# Patient Record
Sex: FEMALE | Race: WHITE | NOT HISPANIC OR LATINO | URBAN - METROPOLITAN AREA
[De-identification: names, ages, dates, MRNs, and addresses within clinical notes are randomized per-mention and may not be internally consistent; named-entity substitution may affect disease eponyms.]

---

## 2024-07-08 ENCOUNTER — TELEPHONE (OUTPATIENT)
Dept: PRIMARY CARE | Facility: CLINIC | Age: 54
End: 2024-07-08

## 2024-07-08 LAB
NON-UH HIE A/G RATIO: 1.2
NON-UH HIE ALB: 3.2 G/DL (ref 3.4–5)
NON-UH HIE ALK PHOS: 94 UNIT/L (ref 45–117)
NON-UH HIE APPEARANCE, U: CLEAR
NON-UH HIE BILIRUBIN, TOTAL: 0.5 MG/DL (ref 0.3–1.2)
NON-UH HIE BILIRUBIN, U: NEGATIVE
NON-UH HIE BLOOD, U: NEGATIVE
NON-UH HIE BUN/CREAT RATIO: 14.3
NON-UH HIE BUN: 10 MG/DL (ref 9–23)
NON-UH HIE CALCIUM: 9.1 MG/DL (ref 8.7–10.4)
NON-UH HIE CALCULATED LDL CHOLESTEROL: 114 MG/DL (ref 60–130)
NON-UH HIE CALCULATED OSMOLALITY: 285 MOSM/KG (ref 275–295)
NON-UH HIE CHLORIDE: 108 MMOL/L (ref 98–107)
NON-UH HIE CHOLESTEROL: 189 MG/DL (ref 100–200)
NON-UH HIE CO2, VENOUS: 29 MMOL/L (ref 20–31)
NON-UH HIE COLOR, U: ABNORMAL
NON-UH HIE CREATININE: 0.7 MG/DL (ref 0.5–0.8)
NON-UH HIE GFR AA: >60
NON-UH HIE GLOBULIN: 2.7 G/DL
NON-UH HIE GLOMERULAR FILTRATION RATE: >60 ML/MIN/1.73M?
NON-UH HIE GLUCOSE QUAL, U: NEGATIVE
NON-UH HIE GLUCOSE: 83 MG/DL (ref 74–106)
NON-UH HIE GOT: 18 UNIT/L (ref 15–37)
NON-UH HIE GPT: 17 UNIT/L (ref 10–49)
NON-UH HIE HCT: 39 % (ref 36–46)
NON-UH HIE HDL CHOLESTEROL: 63 MG/DL (ref 40–60)
NON-UH HIE HGB A1C: 5.4 %
NON-UH HIE HGB: 12.9 G/DL (ref 12–16)
NON-UH HIE INSTR WBC ND: 5.2
NON-UH HIE K: 3.8 MMOL/L (ref 3.5–5.1)
NON-UH HIE KETONES, U: NEGATIVE
NON-UH HIE LEUKOCYTE ESTERASE, U: ABNORMAL
NON-UH HIE MCH: 27.6 PG (ref 27–34)
NON-UH HIE MCHC: 33.1 G/DL (ref 32–37)
NON-UH HIE MCV: 83.3 FL (ref 80–100)
NON-UH HIE MPV: 9 FL (ref 7.4–10.4)
NON-UH HIE MUCOUS, U: ABNORMAL
NON-UH HIE NA: 144 MMOL/L (ref 135–145)
NON-UH HIE NITRITE, U: NEGATIVE
NON-UH HIE PH, U: 5 (ref 4.5–8)
NON-UH HIE PLATELET: 204 X10 (ref 150–450)
NON-UH HIE PROTEIN, U: NEGATIVE
NON-UH HIE RBC/HPF, U: 3 #/HPF (ref 0–3)
NON-UH HIE RBC: 4.68 X10 (ref 4.2–5.4)
NON-UH HIE RDW: 14.7 % (ref 11.5–14.5)
NON-UH HIE SPECIFIC GRAVITY, U: 1.02 (ref 1–1.03)
NON-UH HIE TOTAL CHOL/HDL CHOL RATIO: 3
NON-UH HIE TOTAL PROTEIN: 5.9 G/DL (ref 5.7–8.2)
NON-UH HIE TRIGLYCERIDES: 59 MG/DL (ref 30–150)
NON-UH HIE TSH: 1.87 UIU/ML (ref 0.55–4.78)
NON-UH HIE U MICRO: ABNORMAL
NON-UH HIE UROBILINOGEN QUAL, U: ABNORMAL
NON-UH HIE WBC/HPF, U: 17 #/HPF (ref 0–5)
NON-UH HIE WBC: 5.2 X10 (ref 4.5–11)

## 2024-07-08 NOTE — TELEPHONE ENCOUNTER
----- Message from Henrry Arthur MD sent at 7/8/2024  3:34 PM EDT -----  Albumin 3.2 mild UTI hemoglobin A1c 5.4    Drink lots of fluid plus Keflex 250 mg 3 times a day for 1 week follow-up 2 weeks bring all medicine with you

## 2024-07-10 ENCOUNTER — TELEPHONE (OUTPATIENT)
Dept: PRIMARY CARE | Facility: CLINIC | Age: 54
End: 2024-07-10

## 2024-07-10 DIAGNOSIS — N39.0 URINARY TRACT INFECTION WITHOUT HEMATURIA, SITE UNSPECIFIED: ICD-10-CM

## 2024-07-10 NOTE — TELEPHONE ENCOUNTER
NEEDS A LETTER STATING THAT THIS INDIVIDUAL  HAD LAB WORK DRAWN DUE TO WEIGHT ISSUES. WE ARE NOT PCP. SHE IS PT OF DR FOWLER.  BUT CAME IN WITH BROTHER IN LAW AND LABS WERE ORDERED. PER ELISE NEEDS TO BE GIVEN TO DR RIVERA    PT GIVES PLASMA AND THEY NEED THIS LETTER STATING WHY LABS DRAWN    FAX: [phone]

## 2024-07-11 RX ORDER — CEPHALEXIN 250 MG/1
250 CAPSULE ORAL 3 TIMES DAILY
Qty: 21 CAPSULE | Refills: 0 | Status: SHIPPED | OUTPATIENT
Start: 2024-07-11 | End: 2024-07-18

## 2024-07-11 NOTE — TELEPHONE ENCOUNTER
You mentioned you wanted to see her in 2 weeks. Patient wants to know if you can fit her in sooner. Your schedule is book. Would it be okay to double book for her?

## 2024-07-12 ENCOUNTER — TELEPHONE (OUTPATIENT)
Dept: PRIMARY CARE | Facility: CLINIC | Age: 54
End: 2024-07-12

## 2024-07-12 NOTE — TELEPHONE ENCOUNTER
NEEDS A LETTER STATING THAT THIS INDIVIDUAL  HAD LAB WORK DRAWN DUE TO WEIGHT ISSUES. WE ARE NOT PCP. SHE IS PT OF DR FOWLER.  BUT CAME IN WITH BROTHER IN LAW AND LABS WERE ORDERED. PER ELISE NEEDS TO BE GIVEN TO DR RIVERA     PT GIVES PLASMA AND THEY NEED THIS LETTER STATING WHY LABS DRAWN     FAX: [phone]

## 2024-07-17 ENCOUNTER — TELEPHONE (OUTPATIENT)
Dept: PRIMARY CARE | Facility: CLINIC | Age: 54
End: 2024-07-17

## 2024-07-17 NOTE — TELEPHONE ENCOUNTER
PT HAS BEEN REQUESTING LAB RESULTS AND A LETTER STATING SHE HAD BLOOD DRAWN DUE TO WEIGHT ISSUES. SHE HAS NOT PREVIOUSLY BEEN SEEN IN OUR OFFICE, BUT HAS SCHEDULED A NEW PT VISIT FOR 8/13. THE CONTACT INFORMATION FOR WHERE SHE NEEDS THINGS SENT IS:  PHONE: [phone]  FAX: [phone]

## 2024-08-13 ENCOUNTER — TELEPHONE (OUTPATIENT)
Dept: PRIMARY CARE | Facility: CLINIC | Age: 54
End: 2024-08-13

## 2024-08-13 ENCOUNTER — LAB (OUTPATIENT)
Dept: LAB | Facility: LAB | Age: 54
End: 2024-08-13
Payer: COMMERCIAL

## 2024-08-13 ENCOUNTER — APPOINTMENT (OUTPATIENT)
Dept: PRIMARY CARE | Facility: CLINIC | Age: 54
End: 2024-08-13
Payer: COMMERCIAL

## 2024-08-13 VITALS
TEMPERATURE: 97 F | WEIGHT: 278 LBS | SYSTOLIC BLOOD PRESSURE: 130 MMHG | OXYGEN SATURATION: 96 % | BODY MASS INDEX: 39.8 KG/M2 | HEART RATE: 83 BPM | HEIGHT: 70 IN | DIASTOLIC BLOOD PRESSURE: 77 MMHG

## 2024-08-13 DIAGNOSIS — N39.0 URINARY TRACT INFECTION WITHOUT HEMATURIA, SITE UNSPECIFIED: ICD-10-CM

## 2024-08-13 DIAGNOSIS — Z79.899 MEDICATION MANAGEMENT: ICD-10-CM

## 2024-08-13 DIAGNOSIS — Z12.31 SCREENING MAMMOGRAM FOR BREAST CANCER: ICD-10-CM

## 2024-08-13 DIAGNOSIS — Z00.01 ANNUAL VISIT FOR GENERAL ADULT MEDICAL EXAMINATION WITH ABNORMAL FINDINGS: ICD-10-CM

## 2024-08-13 DIAGNOSIS — F33.1 MODERATE EPISODE OF RECURRENT MAJOR DEPRESSIVE DISORDER (MULTI): ICD-10-CM

## 2024-08-13 DIAGNOSIS — Z00.00 MEDICARE ANNUAL WELLNESS VISIT, SUBSEQUENT: Primary | ICD-10-CM

## 2024-08-13 LAB
AMPHETAMINES UR QL SCN: NORMAL
BARBITURATES UR QL SCN: NORMAL
BENZODIAZ UR QL SCN: NORMAL
BZE UR QL SCN: NORMAL
CANNABINOIDS UR QL SCN: NORMAL
FENTANYL+NORFENTANYL UR QL SCN: NORMAL
HCV AB SER QL: NONREACTIVE
HIV 1+2 AB+HIV1 P24 AG SERPL QL IA: NONREACTIVE
METHADONE UR QL SCN: NORMAL
OPIATES UR QL SCN: NORMAL
OXYCODONE+OXYMORPHONE UR QL SCN: NORMAL
PCP UR QL SCN: NORMAL

## 2024-08-13 PROCEDURE — 87389 HIV-1 AG W/HIV-1&-2 AB AG IA: CPT

## 2024-08-13 PROCEDURE — 99497 ADVNCD CARE PLAN 30 MIN: CPT | Performed by: INTERNAL MEDICINE

## 2024-08-13 PROCEDURE — 36415 COLL VENOUS BLD VENIPUNCTURE: CPT

## 2024-08-13 PROCEDURE — 3008F BODY MASS INDEX DOCD: CPT | Performed by: INTERNAL MEDICINE

## 2024-08-13 PROCEDURE — G0439 PPPS, SUBSEQ VISIT: HCPCS | Performed by: INTERNAL MEDICINE

## 2024-08-13 PROCEDURE — 99213 OFFICE O/P EST LOW 20 MIN: CPT | Performed by: INTERNAL MEDICINE

## 2024-08-13 PROCEDURE — 1036F TOBACCO NON-USER: CPT | Performed by: INTERNAL MEDICINE

## 2024-08-13 PROCEDURE — 86803 HEPATITIS C AB TEST: CPT

## 2024-08-13 PROCEDURE — 80307 DRUG TEST PRSMV CHEM ANLYZR: CPT

## 2024-08-13 RX ORDER — BISMUTH SUBSALICYLATE 262 MG
1 TABLET,CHEWABLE ORAL DAILY
COMMUNITY
End: 2024-08-13 | Stop reason: ALTCHOICE

## 2024-08-13 RX ORDER — NAPROXEN SODIUM 220 MG/1
TABLET ORAL
COMMUNITY
End: 2024-08-13 | Stop reason: ALTCHOICE

## 2024-08-13 RX ORDER — PHENTERMINE HYDROCHLORIDE 37.5 MG/1
37.5 TABLET ORAL
Qty: 30 TABLET | Refills: 0 | Status: SHIPPED | OUTPATIENT
Start: 2024-08-13 | End: 2024-09-12

## 2024-08-13 RX ORDER — DULOXETIN HYDROCHLORIDE 30 MG/1
CAPSULE, DELAYED RELEASE ORAL
COMMUNITY
Start: 2014-11-17

## 2024-08-13 RX ORDER — PHENTERMINE HYDROCHLORIDE 37.5 MG/1
37.5 TABLET ORAL
Qty: 30 TABLET | Refills: 0 | Status: SHIPPED | OUTPATIENT
Start: 2024-08-13 | End: 2024-08-13 | Stop reason: SDUPTHER

## 2024-08-13 ASSESSMENT — ACTIVITIES OF DAILY LIVING (ADL)
DOING_HOUSEWORK: INDEPENDENT
BATHING: INDEPENDENT
TAKING_MEDICATION: INDEPENDENT
MANAGING_FINANCES: INDEPENDENT
GROCERY_SHOPPING: INDEPENDENT
DRESSING: INDEPENDENT

## 2024-08-13 ASSESSMENT — PATIENT HEALTH QUESTIONNAIRE - PHQ9
2. FEELING DOWN, DEPRESSED OR HOPELESS: SEVERAL DAYS
SUM OF ALL RESPONSES TO PHQ9 QUESTIONS 1 AND 2: 2
10. IF YOU CHECKED OFF ANY PROBLEMS, HOW DIFFICULT HAVE THESE PROBLEMS MADE IT FOR YOU TO DO YOUR WORK, TAKE CARE OF THINGS AT HOME, OR GET ALONG WITH OTHER PEOPLE: SOMEWHAT DIFFICULT
1. LITTLE INTEREST OR PLEASURE IN DOING THINGS: SEVERAL DAYS

## 2024-08-13 NOTE — PROGRESS NOTES
Assessment and Plan:  Problem List Items Addressed This Visit       Moderate episode of recurrent major depressive disorder (Multi)    Screening mammogram for breast cancer - Primary    Relevant Orders    BI mammo bilateral screening tomosynthesis    Adult BMI 39.0-39.9 kg/sq m    Relevant Orders    HIV 1/2 Antigen/Antibody Screen with Reflex to Confirmation    Colonoscopy Screening; Average Risk Patient    Hepatitis C antibody    BI mammo bilateral screening tomosynthesis    Drug Screen, Urine With Reflex to Confirmation    Urinary tract infection without hematuria    Relevant Orders    HIV 1/2 Antigen/Antibody Screen with Reflex to Confirmation    Colonoscopy Screening; Average Risk Patient    Hepatitis C antibody    BI mammo bilateral screening tomosynthesis    Drug Screen, Urine With Reflex to Confirmation         Chief Complaint:   Annual Medicare Wellness Office Exam/Comprehensive Problem Focused Follow Up and Physical Exam anxiety depression obesity UTI      HPI: 54-year-old patient reviewed over have no children    No brother 1 sister    Mother have blood pressure    Father have obesity    Personal history of chronic lower back pain chronic anxiety depression obesity sleep apnea    UTI    Complaining of the obesity obesity arthralgia myalgia fatigue tired back pain insomnia snoring    Negative for suicidal ideation    Negative for rectal bleeding vaginal bleeding hematuria    Negative for headache or chest pain    Negative for RSV flu or COVID    Discussed about depression discussed about DNR    .DNR voluntary discuss with patient about Advance care planning DO NOT RESUSCITATE I  spent more than 18 minutes discussing advanced Planning including an explanation and discussion of advanced directives discussed about a living will and power of  patient was encouraged to work on completing this documentation options are1= DO NOT RESUSCITATE CC2=, DO NOT RESUSCITATE  at arrest,3= full code documented in the  chart.        Patient Active Problem List:  Patient Active Problem List   Diagnosis    Moderate episode of recurrent major depressive disorder (Multi)    Screening mammogram for breast cancer    Adult BMI 39.0-39.9 kg/sq m    Urinary tract infection without hematuria          Comprehensive Medical/Surgical/Social/Family History:  No family history on file.    Past Medical History:   Diagnosis Date    Personal history of other diseases of the circulatory system     History of hypertension    Personal history of other mental and behavioral disorders     History of depression       Past Surgical History:   Procedure Laterality Date    OTHER SURGICAL HISTORY  11/25/2019    Hysterectomy total    OTHER SURGICAL HISTORY  11/25/2019    Appendectomy       Social History     Socioeconomic History    Marital status: Single   Tobacco Use    Smoking status: Former     Types: Cigarettes    Smokeless tobacco: Never   Substance and Sexual Activity    Alcohol use: Not Currently     Alcohol/week: 0.0 - 1.0 standard drinks of alcohol    Drug use: Never     Social Determinants of Health     Financial Resource Strain: Not on File (9/24/2020)    Received from [facility]    Financial Resource Strain     Financial Resource Strain: 0   Recent Concern: Financial Resource Strain - At Risk (9/24/2020)    Received from [facility]    Financial Resource Strain     Financial Resource Strain: 2   Food Insecurity: Not on File (9/24/2020)    Received from [facility]    Food Insecurity     Food: 0   Transportation Needs: Not on File (9/24/2020)    Received from [facility]    Transportation Needs     Transportation: 0   Physical Activity: Not on File (9/24/2020)    Received from [facility]    Physical Activity     Physical Activity: 0   Recent Concern: Physical Activity - At Risk (9/24/2020)    Received from [facility]    Physical Activity     Physical Activity: 2   Stress: Not on File (9/24/2020)    Received from [facility]    Stress     Stress: 0   Social Connections: Not at Risk  (2020)    Received from [facility]    Social Connections     Social Connections and Isolation: 1   Housing Stability: Not on File (2020)    Received from [facility]     Housin   Recent Concern: Housing Stability - At Risk (2020)    Received from [facility]     Housin       Tobacco/Alcohol/Opioid use, as well as Illicit Drug Use was screened for/reviewed and documented in Social Documentation section of the chart and medication list as appropriate    Allergies and Medications  No Known Allergies  Current Outpatient Medications   Medication Sig Dispense Refill    DULoxetine (Cymbalta) 30 mg DR capsule Take by mouth.       No current facility-administered medications for this visit.       Medications and Supplements  prescribed by me and other practitioners or clinical pharmacist (such as prescriptions, OTC's, herbal therapies and supplements) were reviewed and documented in the medical record.     Advance directives  Advanced Care Planning (including a Living Will, Healthcare POA, as well as specific end of life choices and/or directives), was discussed for approximately 16 minutes with the patient and/or surrogate, voluntarily, and documented in the Problem List of the medical record.     Cardiac Risk Assessment  Cardiovascular risk was discussed and, if needed, lifestyle modifications recommended, including nutritional choices, exercise, and elimination of habits contributing to risk. We agreed on a plan to reduce the current cardiovascular risk based on above discussion as needed.  Aspirin use/disuse was discussed and documented in the Problem List of the medical record after reviewing the updated guidelines below:    Consider low dose Aspirin ( mg) use if the benefit for cardiovascular disease prevention outweighs risk for bleeding complications.   In general, low dose ASA should be considered:  In patients WITHOUT prior MI/stroke/PAD (primary  "prevention):   a. Age <60: Use if 10-year cardiovascular disease risk >20%, with discussion of risks and benefits with patient  b. Age 60-<70: Use if 10-year cardiovascular disease risk >20% and low bleeding (e.g., gastrointenstinal) risk, with discussion of risks and benefits with patient  c. Age >=70: Do not use    In patients WITH prior MI/stroke/PAD (secondary prevention):   Generally use unless extremely high bleeding (e.g., gastrointenstinal) risk, with discussion of risks and benefits with patient    ROS otherwise negative aside from what was mentioned above in HPI.    Visit Vitals  /77   Pulse 83   Temp 36.1 °C (97 °F)   Ht 1.778 m (5' 10\")   Wt 126 kg (278 lb)   SpO2 96%   BMI 39.89 kg/m²   Smoking Status Former   BSA 2.49 m²       Physical Exam  Physical Exam: BMI 39    Appearance: Alert, oriented , cooperative,  in no acute distress. Well nourished & well hydrated.    Skin: Intact,  dry skin, no lesions, rash, petechiae or purpura.     Eyes: PERRLA, EOMs intact,  Conjunctiva pink with no redness or exudates. Cornea & anterior chamber are clear, Eyelids without lesions. No scleral icterus.     ENT: Hearing grossly intact. External auditory canals patent, tympanic membranes intact with visible landmarks. Nares patent, mucus membranes moist. Dentition without lesions. Pharynx clear, uvula midline.     Neck: Supple, without meningismus. Thyroid not palpable. Trachea at midline. No lymphadenopathy.    Pulmonary: Basal crackles r.    Cardiac: Ankle 1+ edema    Abdomen: Soft, nontender, active bowel sounds.  No palpable organomegaly.  No rebound or guarding.  No CVA tenderness.    Genitourinary: Exam deferred.    Musculoskeletal: Arthritis of lumbar spine and both hip  Neurological: Lumbar radiculopathy  Psychiatric: Anxiety depression without suicide    During the course of the visit the patient was educated and counseled about age appropriate screening and preventive services. Completed preventive " screenings were documented in the chart and orders were placed for outstanding screenings/procedures as documented in the Assessment and Plan.    Patient Instructions (the written plan) was given to the patient at check out.    Charting was completed using voice recognition technology and may include unintended errors.

## 2024-08-13 NOTE — ASSESSMENT & PLAN NOTE
I personally reviewed the OARRS report for this patient. This report is scanned into the electronic medical record. I have considered  the risks of abuse, dependence, addiction and diversion. After discussion, patient does have a good understanding of the safety and  risks of this medication. I believe that it is clinically appropriate for this patient to be prescribed this medication.  See patient back in 6 weeks.

## 2024-08-13 NOTE — TELEPHONE ENCOUNTER
[facility] DOES NOT HAVE MEDICATION. NEEDS TO BE SENT TO [facility] IN [facility]

## 2024-08-19 ENCOUNTER — TELEPHONE (OUTPATIENT)
Dept: PRIMARY CARE | Facility: CLINIC | Age: 54
End: 2024-08-19
Payer: COMMERCIAL

## 2024-08-19 NOTE — TELEPHONE ENCOUNTER
Patient recently started phentermine. Stated she felt good the first two days and then yesterday after she took medication she started feeling dizzy. Has not taken med yet today and still not feeling back to normal.

## 2024-09-12 ENCOUNTER — APPOINTMENT (OUTPATIENT)
Dept: PRIMARY CARE | Facility: CLINIC | Age: 54
End: 2024-09-12
Payer: COMMERCIAL

## 2024-09-12 ENCOUNTER — OFFICE VISIT (OUTPATIENT)
Dept: PRIMARY CARE | Facility: CLINIC | Age: 54
End: 2024-09-12
Payer: COMMERCIAL

## 2024-09-12 VITALS
SYSTOLIC BLOOD PRESSURE: 157 MMHG | HEART RATE: 74 BPM | DIASTOLIC BLOOD PRESSURE: 95 MMHG | OXYGEN SATURATION: 98 % | WEIGHT: 277 LBS | HEIGHT: 70 IN | BODY MASS INDEX: 39.65 KG/M2 | TEMPERATURE: 96.9 F

## 2024-09-12 DIAGNOSIS — E78.2 HYPERLIPEMIA, MIXED: ICD-10-CM

## 2024-09-12 DIAGNOSIS — F33.1 MODERATE EPISODE OF RECURRENT MAJOR DEPRESSIVE DISORDER (MULTI): ICD-10-CM

## 2024-09-12 DIAGNOSIS — I10 BENIGN ESSENTIAL HYPERTENSION: Primary | ICD-10-CM

## 2024-09-12 PROBLEM — N39.0 URINARY TRACT INFECTION WITHOUT HEMATURIA: Status: RESOLVED | Noted: 2024-08-13 | Resolved: 2024-09-12

## 2024-09-12 PROBLEM — Z12.31 SCREENING MAMMOGRAM FOR BREAST CANCER: Status: RESOLVED | Noted: 2024-08-13 | Resolved: 2024-09-12

## 2024-09-12 PROCEDURE — 1036F TOBACCO NON-USER: CPT | Performed by: INTERNAL MEDICINE

## 2024-09-12 PROCEDURE — 3008F BODY MASS INDEX DOCD: CPT | Performed by: INTERNAL MEDICINE

## 2024-09-12 PROCEDURE — 3080F DIAST BP >= 90 MM HG: CPT | Performed by: INTERNAL MEDICINE

## 2024-09-12 PROCEDURE — 99214 OFFICE O/P EST MOD 30 MIN: CPT | Performed by: INTERNAL MEDICINE

## 2024-09-12 PROCEDURE — 3077F SYST BP >= 140 MM HG: CPT | Performed by: INTERNAL MEDICINE

## 2024-09-12 RX ORDER — BISMUTH SUBSALICYLATE 262 MG
1 TABLET,CHEWABLE ORAL DAILY
COMMUNITY

## 2024-09-12 RX ORDER — ASPIRIN 81 MG/1
81 TABLET ORAL DAILY
COMMUNITY

## 2024-09-12 RX ORDER — BENAZEPRIL HYDROCHLORIDE AND HYDROCHLOROTHIAZIDE 10; 12.5 MG/1; MG/1
1 TABLET ORAL DAILY
Qty: 90 TABLET | Refills: 3 | Status: SHIPPED | OUTPATIENT
Start: 2024-09-12 | End: 2025-09-12

## 2024-09-12 NOTE — PROGRESS NOTES
Subjective   Patient ID: Melissa Rodriguez is a 54 y.o. female who presents for Follow-up (Blood pressure all over the place /Should she be taking ASA 81 mg one a day?).    Assessment/Plan     Problem List Items Addressed This Visit       Moderate episode of recurrent major depressive disorder (Multi)     PHQ less than 7 continue Cymbalta follow-up         Adult BMI 39.0-39.9 kg/sq m     Diet exercise sleep apnea test follow-up         Benign essential hypertension - Primary     Patients BP readings reviewed and addressed, as we age our arteries turn stiffer and less elastic. Restricting salt consumption and staying physically fit with regular exercise regimen is the only way to keep our vasculature less tonic. Studies have shown that keeping ideal body wt, exercise routine about 140 to 150 minutes a week, eating variety of plant based diet and drinking plentiful water are quite helpful. Monitor BP twice or once a week at home and bring log to be reviewed by me. Uncontrolled BP has long term consequences including heart failure, myocardial infarction, accelerated atherosclerosis and kidney dysfunction. Therapy reviewed and explained.           Hyperlipemia, mixed     Repeat sugar thyroid and lipid          Patients BP readings reviewed and addressed, as we age our arteries turn stiffer and less elastic. Restricting salt consumption and staying physically fit with regular exercise regimen is the only way to keep our vasculature less tonic. Studies have shown that keeping ideal body wt, exercise routine about 140 to 150 minutes a week, eating variety of plant based diet and drinking plentiful water are quite helpful. Monitor BP twice or once a week at home and bring log to be reviewed by me. Uncontrolled BP has long term consequences including heart failure, myocardial infarction, accelerated atherosclerosis and kidney dysfunction. Therapy reviewed and explained.    HPI 54-year-old patient have anxiety depression obesity  did try the diet exercise Adipex not able to tolerate    Complaining of hypertension hyperlipidemia associate with obesity very about heart and stroke    Advised continue aspirin B12 folic acid add on Lotensin HCTZ 10/12 point 5 in the morning monitor the angioedema dry cough and electrolytes    Negative for suicide    Negative for headache or chest pain    Add on aspirin 81 mg a day because of the side effect discontinue Adipex  Past Medical History:   Diagnosis Date    Personal history of other diseases of the circulatory system     History of hypertension    Personal history of other mental and behavioral disorders     History of depression     Past Surgical History:   Procedure Laterality Date    OTHER SURGICAL HISTORY  11/25/2019    Hysterectomy total    OTHER SURGICAL HISTORY  11/25/2019    Appendectomy     No Known Allergies  Current Outpatient Medications   Medication Sig Dispense Refill    DULoxetine (Cymbalta) 30 mg DR capsule Take by mouth.      multivitamin tablet Take 1 tablet by mouth once daily.       No current facility-administered medications for this visit.     No family history on file.  Social History     Socioeconomic History    Marital status: Single   Tobacco Use    Smoking status: Former     Types: Cigarettes    Smokeless tobacco: Never   Substance and Sexual Activity    Alcohol use: Yes     Alcohol/week: 0.0 - 1.0 standard drinks of alcohol    Drug use: Never     Social Determinants of Health     Financial Resource Strain: Not on File (9/24/2020)    Received from [facility]    Financial Resource Strain     Financial Resource Strain: 0   Recent Concern: Financial Resource Strain - At Risk (9/24/2020)    Received from [facility]    Financial Resource Strain     Financial Resource Strain: 2   Transportation Needs: Not on File (9/24/2020)    Received from [facility]    Transportation Needs     Transportation: 0   Physical Activity: Not on File (9/24/2020)    Received from [facility]    Physical Activity      "Physical Activity: 0   Recent Concern: Physical Activity - At Risk (2020)    Received from [facility]    Physical Activity     Physical Activity: 2   Stress: Not on File (2020)    Received from [facility]    Stress     Stress: 0   Social Connections: Not on File (2020)    Received from [facility]    Social Connections     Connectedness: 0   Housing Stability: Not on File (2020)    Received from [facility]    Housing Stability     Housin   Recent Concern: Housing Stability - At Risk (2020)    Received from [facility]    Housing Stability     Housin       There is no immunization history on file for this patient.    Review of Systems  Review of systems is otherwise negative unless stated above or in history of present illness.    Objective   Visit Vitals  BP (!) 157/95   Pulse 74   Temp 36.1 °C (96.9 °F)   Ht 1.778 m (5' 10\")   Wt 126 kg (277 lb)   SpO2 98%   BMI 39.75 kg/m²   Smoking Status Former   BSA 2.49 m²     Physical Exam  Constitutional: BMI 39     General: not in acute distress.   HENT:      Head: Normocephalic and atraumatic.      Nose: Nose normal.   Eyes:      Extraocular Movements: Extraocular movements intact.      Conjunctiva/sclera: Conjunctivae normal.   Cardiovascular: Heart murmur     Rate and Rhythm: Normal rate ,  No M/R/G  Pulmonary:      Effort: Pulmonary effort is normal.      Breath sounds: Normal, Bilat Equal AE  Skin: Dry skin     General: Skin is warm.   Neurological:      Mental Status: He is alert and oriented to person, place, and time.   Psychiatric:   Anxiety depression without suicide   Mood and Affect: Mood normal.         Behavior: Behavior normal.   Musculoskeletal   FROM in all extremitirs,  Joint-no swelling or tenderness    Lab on 2024   Component Date Value Ref Range Status    HIV 1/2 Antigen/Antibody Screen wi* 2024 Nonreactive  Nonreactive Final    Hepatitis C AB 2024 Nonreactive  Nonreactive Final    Amphetamine Screen, Urine 2024 Presumptive " Negative  Presumptive Negative Final    Barbiturate Screen, Urine 08/13/2024 Presumptive Negative  Presumptive Negative Final    Benzodiazepines Screen, Urine 08/13/2024 Presumptive Negative  Presumptive Negative Final    Cannabinoid Screen, Urine 08/13/2024 Presumptive Negative  Presumptive Negative Final    Cocaine Metabolite Screen, Urine 08/13/2024 Presumptive Negative  Presumptive Negative Final    Fentanyl Screen, Urine 08/13/2024 Presumptive Negative  Presumptive Negative Final    Opiate Screen, Urine 08/13/2024 Presumptive Negative  Presumptive Negative Final    Oxycodone Screen, Urine 08/13/2024 Presumptive Negative  Presumptive Negative Final    PCP Screen, Urine 08/13/2024 Presumptive Negative  Presumptive Negative Final    Methadone Screen, Urine 08/13/2024 Presumptive Negative  Presumptive Negative Final   Orders Only on 07/08/2024   Component Date Value Ref Range Status    [facility] HCT 07/08/2024 39.0  36.0 - 46.0 % Final    [facility] Platelet 07/08/2024 204  150 - 450 x10 Final    [facility] RBC 07/08/2024 4.68  4.20 - 5.40 x10 Final    [facility] Instr WBC ND 07/08/2024 5.2   Final    [facility] MCHC 07/08/2024 33.1  32.0 - 37.0 g/dL Final    [facility] MCV 07/08/2024 83.3  80.0 - 100.0 fL Final    [facility] MPV 07/08/2024 9.0  7.4 - 10.4 fL Final    [facility] HGB 07/08/2024 12.9  12.0 - 16.0 g/dL Final    [facility] WBC 07/08/2024 5.2  4.5 - 11.0 x10 Final    [facility] RDW 07/08/2024 14.7 (H)  11.5 - 14.5 % Final    [facility] MCH 07/08/2024 27.6  27.0 - 34.0 pg Final    [facility] Urobilinogen Qual, U 07/08/2024 <2.0 mg/dl  <2.0 mg/dl Final    [facility] Glucose Qual, U 07/08/2024 Negative  Negative Final    [facility] Mucous, U 07/08/2024 Occasional   Final    [facility] Color, U 07/08/2024 Nicole   Final    [facility] pH, U 07/08/2024 5.0  4.5 - 8.0 Final    [facility] Specific Gravity, U 07/08/2024 1.019  1.001 - 1.035 Final    [facility] RBC/HPF, U 07/08/2024 3  0 - 3 #/HPF Final    [facility] Bilirubin, U 07/08/2024 Negative  Negative Final    [facility] Nitrite, U 07/08/2024 Negative  Negative Final    [facility] Protein, U 07/08/2024 Negative  Negative Final    [facility] Appearance, U 07/08/2024 Clear   Final    [facility] WBC/HPF, U 07/08/2024 17 (H)  0 - 5 #/HPF Final    [facility] Blood, U 07/08/2024 Negative  Negative Final    [facility] Ketones, U 07/08/2024 Negative  Negative Final    [facility] Leukocyte Esterase, U 07/08/2024 Moderate (A)  Negative Final    [facility] U MICRO 07/08/2024 Indicated   Final    [facility] Chloride 07/08/2024 108 (H)  98 - 107 mmol/L Final    [facility] Bilirubin, Total 07/08/2024 0.50  0.30 - 1.20 mg/dL Final    [facility] Na 07/08/2024 144  135 - 145 mmol/L Final    [facility] A/G Ratio 07/08/2024 1.2   Final    [facility] BUN/Creat Ratio 07/08/2024 14.3   Final    [facility] GPT 07/08/2024 17  10 - 49 unit/L Final    [facility] Creatinine 07/08/2024 0.7  0.5 - 0.8 mg/dL Final    [facility] Alk Phos 07/08/2024 94  45 - 117 unit/L Final    [facility] Total Protein 07/08/2024 5.9  5.7 - 8.2 g/dL Final    [facility] CO2, venous 07/08/2024 29.0  20.0 - 31.0 mmol/L Final    [facility] Glomerular Filtration R* 07/08/2024 >60  mL/min/1.73m? Final    [facility] K 07/08/2024 3.8  3.5 - 5.1 mmol/L Final    [facility] Calculated Osmolality 07/08/2024 285  275 - 295 mOsm/kg Final    [facility] BUN 07/08/2024 10  9 - 23 mg/dL Final    [facility] Globulin 07/08/2024 2.7  g/dL Final    [facility] Calcium 07/08/2024 9.1  8.7 - 10.4 mg/dL Final    [facility] GFR AA 07/08/2024 >60   Final    [facility] Glucose 07/08/2024 83  74 - 106 mg/dL Final    [facility] GOT 07/08/2024 18  15 - 37 unit/L Final    [facility] ALB 07/08/2024 3.2 (L)  3.4 - 5.0 g/dL Final    [facility] Calculated LDL Choleste* 07/08/2024 114  60 - 130 mg/dL Final    [facility] HDL Cholesterol 07/08/2024 63 (H)  40 - 60 mg/dL Final    [facility] Total Chol/HDL Chol Rat* 07/08/2024 3.0   Final    [facility]  Triglycerides 07/08/2024 59  30 - 150 mg/dL Final    [facility] Cholesterol 07/08/2024 189  100 - 200 mg/dL Final    [facility] TSH 07/08/2024 1.87  0.55 - 4.78 uIU/ml Final    [facility] HGB A1C 07/08/2024 5.4  % Final       Radiology: Reviewed imaging in powerchart.  No results found.      Charting was completed using voice recognition technology and may include unintended errors.

## 2024-10-08 ENCOUNTER — TELEPHONE (OUTPATIENT)
Dept: PRIMARY CARE | Facility: CLINIC | Age: 54
End: 2024-10-08
Payer: COMMERCIAL

## 2024-12-12 ENCOUNTER — APPOINTMENT (OUTPATIENT)
Dept: PRIMARY CARE | Facility: CLINIC | Age: 54
End: 2024-12-12
Payer: COMMERCIAL

## 2024-12-23 ENCOUNTER — TELEPHONE (OUTPATIENT)
Dept: GASTROENTEROLOGY | Facility: CLINIC | Age: 54
End: 2024-12-23
Payer: COMMERCIAL

## 2024-12-23 NOTE — TELEPHONE ENCOUNTER
Dr. Arthur put in an order for colonoscopy; please call , option 2, to schedule with Dr. Mittal or one of our other providers.  Thank you.

## 2025-01-20 ENCOUNTER — TELEPHONE (OUTPATIENT)
Dept: GASTROENTEROLOGY | Facility: CLINIC | Age: 55
End: 2025-01-20
Payer: COMMERCIAL

## 2025-01-20 NOTE — TELEPHONE ENCOUNTER
Dr. Arthur put in an order for colonoscopy; patient wants to wait until  March 2025 as she has family obligations and cannot schedule now.  We will call her back. Patient was agreeable with this plan.

## 2025-01-31 ENCOUNTER — OFFICE VISIT (OUTPATIENT)
Dept: PRIMARY CARE | Facility: CLINIC | Age: 55
End: 2025-01-31
Payer: COMMERCIAL

## 2025-01-31 VITALS
TEMPERATURE: 97.3 F | DIASTOLIC BLOOD PRESSURE: 89 MMHG | HEIGHT: 70 IN | BODY MASS INDEX: 39.8 KG/M2 | OXYGEN SATURATION: 94 % | SYSTOLIC BLOOD PRESSURE: 122 MMHG | WEIGHT: 278 LBS | HEART RATE: 76 BPM

## 2025-01-31 DIAGNOSIS — R05.9 COUGH, UNSPECIFIED TYPE: Primary | ICD-10-CM

## 2025-01-31 DIAGNOSIS — A49.9 BACTERIAL INFECTION: ICD-10-CM

## 2025-01-31 DIAGNOSIS — I10 BENIGN ESSENTIAL HYPERTENSION: ICD-10-CM

## 2025-01-31 PROCEDURE — 3008F BODY MASS INDEX DOCD: CPT | Performed by: EMERGENCY MEDICINE

## 2025-01-31 PROCEDURE — 3074F SYST BP LT 130 MM HG: CPT | Performed by: EMERGENCY MEDICINE

## 2025-01-31 PROCEDURE — 3079F DIAST BP 80-89 MM HG: CPT | Performed by: EMERGENCY MEDICINE

## 2025-01-31 PROCEDURE — 99213 OFFICE O/P EST LOW 20 MIN: CPT | Performed by: EMERGENCY MEDICINE

## 2025-01-31 RX ORDER — AZITHROMYCIN 250 MG/1
250 TABLET, FILM COATED ORAL DAILY
Qty: 6 TABLET | Refills: 0 | Status: SHIPPED | OUTPATIENT
Start: 2025-01-31 | End: 2025-02-05

## 2025-01-31 RX ORDER — PREDNISONE 20 MG/1
1 TABLET ORAL
COMMUNITY
Start: 2025-01-28

## 2025-01-31 NOTE — PROGRESS NOTES
Subjective   Patient ID: Melissa Rodriguez is a 54 y.o. female who presents for office visit    Assessment/Plan     Problem List Items Addressed This Visit    None  Patient presents for follow up visit    Upper respiratory symptoms - Patient presents with cough, sore throat and runny nose. We will prescribe Z-pac and continue to monitor.    Hypertension - Patient complains of high blood pressure at home, however it is stable in office. Patient was counseled to keep a home log and we will monitor.  HPI   54-year-old patient have anxiety depression obesity did try the diet exercise Adipex not able to tolerate    Complaining of hypertension hyperlipidemia associate with obesity very about heart and stroke    Advised continue aspirin B12 folic acid add on Lotensin HCTZ 10/12 point 5 in the morning monitor the angioedema dry cough and electrolytes    Negative for suicide    Negative for headache or chest pain      Past Medical History:   Diagnosis Date    Personal history of other diseases of the circulatory system     History of hypertension    Personal history of other mental and behavioral disorders     History of depression     Past Surgical History:   Procedure Laterality Date    OTHER SURGICAL HISTORY  11/25/2019    Hysterectomy total    OTHER SURGICAL HISTORY  11/25/2019    Appendectomy     No Known Allergies  Current Outpatient Medications   Medication Sig Dispense Refill    aspirin 81 mg EC tablet Take 1 tablet (81 mg) by mouth once daily.      benazepriL-hydrochlorothiazide (Lotensin HCT) 10-12.5 mg tablet Take 1 tablet by mouth once daily. 90 tablet 3    DULoxetine (Cymbalta) 30 mg DR capsule Take by mouth.      multivitamin tablet Take 1 tablet by mouth once daily.       No current facility-administered medications for this visit.     No family history on file.  Social History     Socioeconomic History    Marital status: Single   Tobacco Use    Smoking status: Former     Types: Cigarettes    Smokeless tobacco:  Never   Substance and Sexual Activity    Alcohol use: Yes     Alcohol/week: 0.0 - 1.0 standard drinks of alcohol    Drug use: Never     Social Drivers of Health     Financial Resource Strain: Not on File (2020)    Received from [facility]    Financial Resource Strain     Financial Resource Strain: 0   Recent Concern: Financial Resource Strain - At Risk (2020)    Received from [facility]    Financial Resource Strain     Financial Resource Strain: 2   Food Insecurity: Not on File (2020)    [facility], [facility]    Food Insecurity     Food: 0   Transportation Needs: Not on File (2020)    Received from [facility]    Transportation Needs     Transportation: 0   Physical Activity: Not on File (2020)    Received from [facility]    Physical Activity     Physical Activity: 0   Recent Concern: Physical Activity - At Risk (2020)    Received from [facility]    Physical Activity     Physical Activity: 2   Stress: Not on File (2020)    Received from [facility]    Stress     Stress: 0   Social Connections: Not on File (2020)    Received from [facility]    Social Connections     Connectedness: 0   Housing Stability: Not on File (2020)    Received from [facility]    Housing Stability     Housin   Recent Concern: Housing Stability - At Risk (2020)    Received from [facility]    Housing Stability     Housin       There is no immunization history on file for this patient.    Review of Systems  Review of systems is otherwise negative unless stated above or in history of present illness.    Objective   Visit Vitals  Smoking Status Former     Physical Exam  Constitutional: BMI 39     General: not in acute distress.   HENT:      Head: Normocephalic and atraumatic.      Nose: Nose normal.   Eyes:      Extraocular Movements: Extraocular movements intact.      Conjunctiva/sclera: Conjunctivae normal.   Cardiovascular: Heart murmur     Rate and Rhythm: Normal rate ,  No M/R/G  Pulmonary:      Effort: Pulmonary effort  is normal.      Breath sounds: Normal, Bilat Equal AE  Skin: Dry skin     General: Skin is warm.   Neurological:      Mental Status: He is alert and oriented to person, place, and time.   Psychiatric:   Anxiety depression without suicide   Mood and Affect: Mood normal.         Behavior: Behavior normal.   Musculoskeletal   FROM in all extremitirs,  Joint-no swelling or tenderness    No visits with results within 4 Month(s) from this visit.   Latest known visit with results is:   Lab on 08/13/2024   Component Date Value Ref Range Status    HIV 1/2 Antigen/Antibody Screen wi* 08/13/2024 Nonreactive  Nonreactive Final    Hepatitis C AB 08/13/2024 Nonreactive  Nonreactive Final    Amphetamine Screen, Urine 08/13/2024 Presumptive Negative  Presumptive Negative Final    Barbiturate Screen, Urine 08/13/2024 Presumptive Negative  Presumptive Negative Final    Benzodiazepines Screen, Urine 08/13/2024 Presumptive Negative  Presumptive Negative Final    Cannabinoid Screen, Urine 08/13/2024 Presumptive Negative  Presumptive Negative Final    Cocaine Metabolite Screen, Urine 08/13/2024 Presumptive Negative  Presumptive Negative Final    Fentanyl Screen, Urine 08/13/2024 Presumptive Negative  Presumptive Negative Final    Opiate Screen, Urine 08/13/2024 Presumptive Negative  Presumptive Negative Final    Oxycodone Screen, Urine 08/13/2024 Presumptive Negative  Presumptive Negative Final    PCP Screen, Urine 08/13/2024 Presumptive Negative  Presumptive Negative Final    Methadone Screen, Urine 08/13/2024 Presumptive Negative  Presumptive Negative Final       Radiology: Reviewed imaging in powerchart.  No results found.      Charting was completed using voice recognition technology and may include unintended errors.

## 2025-02-21 ENCOUNTER — TELEPHONE (OUTPATIENT)
Dept: PRIMARY CARE | Facility: CLINIC | Age: 55
End: 2025-02-21

## 2025-02-21 NOTE — TELEPHONE ENCOUNTER
CAN PT TAKE MOTRIN/TYLENOL DUAL ACTION. SHE TAKES BP MEDICATION BENAZEPRIL-HYDRO. WANTS TO MAKE SURE OK. PLEASE ADVISE

## 2025-03-11 ENCOUNTER — TELEPHONE (OUTPATIENT)
Dept: GASTROENTEROLOGY | Facility: CLINIC | Age: 55
End: 2025-03-11
Payer: COMMERCIAL

## 2025-03-11 NOTE — TELEPHONE ENCOUNTER
Dr. Arthur put in an order for colonoscopy; please call , option 2, to schedule with Dr. Mittal.  Thank you.

## 2025-07-08 ENCOUNTER — PATIENT OUTREACH (OUTPATIENT)
Dept: CARE COORDINATION | Facility: CLINIC | Age: 55
End: 2025-07-08
Payer: COMMERCIAL

## 2025-07-08 DIAGNOSIS — Z12.31 ENCOUNTER FOR SCREENING MAMMOGRAM FOR BREAST CANCER: ICD-10-CM

## 2025-08-20 ENCOUNTER — TELEPHONE (OUTPATIENT)
Dept: PRIMARY CARE | Facility: CLINIC | Age: 55
End: 2025-08-20
Payer: COMMERCIAL

## 2025-08-20 DIAGNOSIS — I10 BENIGN ESSENTIAL HYPERTENSION: ICD-10-CM

## 2025-08-21 RX ORDER — BENAZEPRIL HYDROCHLORIDE AND HYDROCHLOROTHIAZIDE 10; 12.5 MG/1; MG/1
1 TABLET ORAL DAILY
Qty: 90 TABLET | Refills: 0 | OUTPATIENT
Start: 2025-08-21

## 2025-08-22 ENCOUNTER — TELEPHONE (OUTPATIENT)
Dept: PRIMARY CARE | Facility: CLINIC | Age: 55
End: 2025-08-22
Payer: COMMERCIAL

## 2025-08-22 DIAGNOSIS — I10 BENIGN ESSENTIAL HYPERTENSION: ICD-10-CM

## 2025-08-22 RX ORDER — BENAZEPRIL HYDROCHLORIDE AND HYDROCHLOROTHIAZIDE 10; 12.5 MG/1; MG/1
1 TABLET ORAL DAILY
Qty: 30 TABLET | Refills: 1 | Status: SHIPPED | OUTPATIENT
Start: 2025-08-22

## 2025-09-09 ENCOUNTER — APPOINTMENT (OUTPATIENT)
Dept: PRIMARY CARE | Facility: CLINIC | Age: 55
End: 2025-09-09
Payer: COMMERCIAL